# Patient Record
Sex: MALE | Race: OTHER | URBAN - METROPOLITAN AREA
[De-identification: names, ages, dates, MRNs, and addresses within clinical notes are randomized per-mention and may not be internally consistent; named-entity substitution may affect disease eponyms.]

---

## 2018-01-19 ENCOUNTER — OFFICE VISIT (OUTPATIENT)
Dept: FAMILY MEDICINE CLINIC | Age: 53
End: 2018-01-19

## 2018-01-19 ENCOUNTER — HOSPITAL ENCOUNTER (OUTPATIENT)
Dept: CT IMAGING | Age: 53
Discharge: HOME | End: 2018-01-19
Attending: FAMILY MEDICINE | Admitting: FAMILY MEDICINE

## 2018-01-19 VITALS
SYSTOLIC BLOOD PRESSURE: 134 MMHG | OXYGEN SATURATION: 98 % | HEIGHT: 66 IN | BODY MASS INDEX: 33.75 KG/M2 | HEART RATE: 87 BPM | DIASTOLIC BLOOD PRESSURE: 84 MMHG | WEIGHT: 210 LBS

## 2018-01-19 DIAGNOSIS — R31.29 OTHER MICROSCOPIC HEMATURIA: ICD-10-CM

## 2018-01-19 DIAGNOSIS — R10.9 LEFT FLANK PAIN: ICD-10-CM

## 2018-01-19 DIAGNOSIS — R03.0 ELEVATED BLOOD PRESSURE READING: ICD-10-CM

## 2018-01-19 DIAGNOSIS — R10.9 LEFT FLANK PAIN: Primary | ICD-10-CM

## 2018-01-19 DIAGNOSIS — R10.9 ABDOMINAL PAIN: ICD-10-CM

## 2018-01-19 LAB
BILIRUBIN, POC: NEGATIVE
BLOOD URINE, POC: ABNORMAL
CLARITY, POC: CLEAR
COLOR, POC: YELLOW
GLUCOSE URINE, POC: NEGATIVE
KETONES, POC: NEGATIVE
LEUKOCYTE EST, POC: ABNORMAL
NITRITE, POC: ABNORMAL
PH, POC: 5.5
PROTEIN, POC: 30
SPECIFIC GRAVITY, POC: 1.02
UROBILINOGEN, POC: 0.2

## 2018-01-19 PROCEDURE — 81002 URINALYSIS NONAUTO W/O SCOPE: CPT | Performed by: FAMILY MEDICINE

## 2018-01-19 PROCEDURE — 99203 OFFICE O/P NEW LOW 30 MIN: CPT | Performed by: FAMILY MEDICINE

## 2018-01-19 RX ORDER — TAMSULOSIN HYDROCHLORIDE 0.4 MG/1
0.4 CAPSULE ORAL DAILY
Qty: 14 CAPSULE | Refills: 0 | Status: SHIPPED | OUTPATIENT
Start: 2018-01-19 | End: 2018-02-05

## 2018-01-19 RX ORDER — CIPROFLOXACIN 500 MG/1
500 TABLET, FILM COATED ORAL 2 TIMES DAILY
Qty: 14 TABLET | Refills: 0 | Status: SHIPPED | OUTPATIENT
Start: 2018-01-19 | End: 2018-01-26

## 2018-01-19 ASSESSMENT — PATIENT HEALTH QUESTIONNAIRE - PHQ9
SUM OF ALL RESPONSES TO PHQ QUESTIONS 1-9: 0
1. LITTLE INTEREST OR PLEASURE IN DOING THINGS: 0
2. FEELING DOWN, DEPRESSED OR HOPELESS: 0
SUM OF ALL RESPONSES TO PHQ9 QUESTIONS 1 & 2: 0

## 2018-01-19 NOTE — PROGRESS NOTES
Guadarrama Caren , 52 y.o. Chief Complaint   Patient presents with    Back Pain     left side flank area. Started yesterday with left flank mild intensity constant with periods were it becomes moderate intensity. Pain at times radiates to left lower abdomen. Pain now is 2/10. No dysuria, no frequency and no urgency. Some stool tenesmus but no diarrhea or constipation. No fever, no nausea a no vomiting. Had an episode of left lower abdominal pain about 6 months and he was diagnosed then with diverticulitis. History of elevated blood pressure and HLP. No surgeries. Does not take prescription medications or OTC meds regularly. No Known Allergies    Social History     Social History    Marital status: Single     Spouse name: N/A    Number of children: N/A    Years of education: N/A     Occupational History    Not on file. Social History Main Topics    Smoking status: Never Smoker    Smokeless tobacco: Never Used    Alcohol use No    Drug use: No    Sexual activity: No     Other Topics Concern    Not on file     Social History Narrative    No narrative on file       Review of Systems   Respiratory: Negative. Cardiovascular: Negative. Neurological: Negative for headaches. OBJECTIVE    Vitals:    01/19/18 1450 01/19/18 1519   BP: (!) 160/100 134/84   Site: Left Arm    Position: Sitting    Cuff Size: Medium Adult    Pulse: 87    SpO2: 98%    Weight: 210 lb (95.3 kg)    Height: 5' 5.75\" (1.67 m)      Body mass index is 34.16 kg/m². Wt Readings from Last 3 Encounters:   01/19/18 210 lb (95.3 kg)     BP Readings from Last 3 Encounters:   01/19/18 134/84     Physical Exam   Constitutional: He is oriented to person, place, and time and well-developed, well-nourished, and in no distress. No distress. HENT:   Mouth/Throat: Oropharynx is clear and moist. No oropharyngeal exudate. Eyes: Pupils are equal, round, and reactive to light. Neck: No thyromegaly present.

## 2018-01-21 LAB — URINE CULTURE, ROUTINE: NORMAL

## 2018-01-25 ASSESSMENT — ENCOUNTER SYMPTOMS: RESPIRATORY NEGATIVE: 1

## 2018-01-26 ENCOUNTER — TELEPHONE (OUTPATIENT)
Dept: FAMILY MEDICINE CLINIC | Age: 53
End: 2018-01-26

## 2018-02-05 ENCOUNTER — OFFICE VISIT (OUTPATIENT)
Dept: FAMILY MEDICINE CLINIC | Age: 53
End: 2018-02-05

## 2018-02-05 VITALS
SYSTOLIC BLOOD PRESSURE: 130 MMHG | HEART RATE: 84 BPM | DIASTOLIC BLOOD PRESSURE: 80 MMHG | BODY MASS INDEX: 34.06 KG/M2 | OXYGEN SATURATION: 98 % | WEIGHT: 209.4 LBS

## 2018-02-05 DIAGNOSIS — E78.49 OTHER HYPERLIPIDEMIA: ICD-10-CM

## 2018-02-05 DIAGNOSIS — N20.1 URETEROLITHIASIS: ICD-10-CM

## 2018-02-05 PROCEDURE — 99214 OFFICE O/P EST MOD 30 MIN: CPT | Performed by: FAMILY MEDICINE

## 2018-02-05 RX ORDER — ATORVASTATIN CALCIUM 20 MG/1
20 TABLET, FILM COATED ORAL DAILY
Qty: 30 TABLET | Refills: 3 | Status: SHIPPED | OUTPATIENT
Start: 2018-02-05 | End: 2018-05-05 | Stop reason: SDUPTHER

## 2018-02-05 ASSESSMENT — ENCOUNTER SYMPTOMS
SPUTUM PRODUCTION: 0
GASTROINTESTINAL NEGATIVE: 1
SHORTNESS OF BREATH: 0
WHEEZING: 0
COUGH: 1
HEMOPTYSIS: 0

## 2018-02-05 NOTE — PROGRESS NOTES
History   Smoking Status    Never Smoker   Smokeless Tobacco    Never Used     PMH, surgeries, SH, FH, allergies reviewed. Medication list reviewed and updated. Chief Complaint   Patient presents with    Results     follow up on abdominal pain      No more pain, pain resolved next day after CT was done,no back pain, no urinary symptoms. Normal bowel movements. No hematuria. Completed cipro and flomax no side effects. Review of Systems   Constitutional: Negative. HENT: Positive for congestion. Respiratory: Positive for cough. Negative for hemoptysis, sputum production, shortness of breath and wheezing. Cardiovascular: Negative. Gastrointestinal: Negative. Genitourinary: Negative. Skin: Negative for itching and rash. Objective:   VS reviewed    Vitals:    02/05/18 0931   BP: 130/80   Site: Left Arm   Position: Sitting   Cuff Size: Large Adult   Pulse: 84   SpO2: 98%   Weight: 209 lb 6.4 oz (95 kg)     Body mass index is 34.06 kg/m². Wt Readings from Last 3 Encounters:   02/05/18 209 lb 6.4 oz (95 kg)   01/19/18 210 lb (95.3 kg)     BP Readings from Last 3 Encounters:   02/05/18 130/80   01/19/18 134/84       Physical Exam   Constitutional: He is oriented to person, place, and time and well-developed, well-nourished, and in no distress. No distress. HENT:   Right Ear: Tympanic membrane and ear canal normal.   Left Ear: Tympanic membrane and ear canal normal.   Mouth/Throat: Oropharynx is clear and moist and mucous membranes are normal. No oropharyngeal exudate. Eyes: Conjunctivae and EOM are normal. Pupils are equal, round, and reactive to light. Right eye exhibits no discharge. Left eye exhibits no discharge. Neck: No thyromegaly present. Cardiovascular: Normal rate, regular rhythm, normal heart sounds and intact distal pulses. No murmur heard. Pulmonary/Chest: Effort normal and breath sounds normal. No respiratory distress. Abdominal: Soft.  Bowel sounds are normal. He

## 2018-02-09 ENCOUNTER — HOSPITAL ENCOUNTER (OUTPATIENT)
Dept: CT IMAGING | Age: 53
Discharge: HOME | End: 2018-02-09
Admitting: FAMILY MEDICINE

## 2018-02-09 DIAGNOSIS — N20.1 URETEROLITHIASIS: ICD-10-CM

## 2018-02-09 DIAGNOSIS — Z12.11 SCREENING FOR COLON CANCER: ICD-10-CM

## 2018-02-09 DIAGNOSIS — Z12.11 SCREENING FOR COLON CANCER: Primary | ICD-10-CM

## 2018-02-09 LAB
CONTROL: POSITIVE
HEMOCCULT STL QL: NEGATIVE

## 2018-02-09 PROCEDURE — 82274 ASSAY TEST FOR BLOOD FECAL: CPT | Performed by: FAMILY MEDICINE

## 2018-02-16 DIAGNOSIS — Z12.5 SCREENING FOR PROSTATE CANCER: Primary | ICD-10-CM

## 2018-03-09 ENCOUNTER — TELEPHONE (OUTPATIENT)
Dept: FAMILY MEDICINE CLINIC | Age: 53
End: 2018-03-09

## 2018-04-20 DIAGNOSIS — Z12.11 SCREENING FOR COLON CANCER: Primary | ICD-10-CM

## 2018-05-07 RX ORDER — ATORVASTATIN CALCIUM 20 MG/1
TABLET, FILM COATED ORAL
Qty: 30 TABLET | Refills: 0 | Status: SHIPPED | OUTPATIENT
Start: 2018-05-07 | End: 2018-06-06 | Stop reason: SDUPTHER

## 2018-05-19 LAB
A/G RATIO: NORMAL
ALBUMIN SERPL-MCNC: 4.4 G/DL
ALP BLD-CCNC: 63 U/L
ALT SERPL-CCNC: 39 U/L
AST SERPL-CCNC: 29 U/L
BILIRUB SERPL-MCNC: 0.7 MG/DL (ref 0.1–1.4)
BILIRUBIN DIRECT: 0.19 MG/DL
BILIRUBIN, INDIRECT: NORMAL
GLOBULIN: NORMAL
PROTEIN TOTAL: 7 G/DL

## 2018-05-22 ENCOUNTER — TELEPHONE (OUTPATIENT)
Dept: FAMILY MEDICINE CLINIC | Age: 53
End: 2018-05-22

## 2018-06-06 RX ORDER — ATORVASTATIN CALCIUM 20 MG/1
20 TABLET, FILM COATED ORAL DAILY
Qty: 90 TABLET | Refills: 1 | Status: SHIPPED | OUTPATIENT
Start: 2018-06-06 | End: 2018-08-29 | Stop reason: SDUPTHER

## 2018-06-20 ENCOUNTER — TELEPHONE (OUTPATIENT)
Dept: FAMILY MEDICINE CLINIC | Age: 53
End: 2018-06-20

## 2018-07-09 ENCOUNTER — TELEPHONE (OUTPATIENT)
Dept: FAMILY MEDICINE CLINIC | Age: 53
End: 2018-07-09

## 2018-07-23 ENCOUNTER — OFFICE VISIT (OUTPATIENT)
Dept: FAMILY MEDICINE CLINIC | Age: 53
End: 2018-07-23

## 2018-07-23 VITALS
SYSTOLIC BLOOD PRESSURE: 138 MMHG | WEIGHT: 215.8 LBS | BODY MASS INDEX: 35.1 KG/M2 | DIASTOLIC BLOOD PRESSURE: 88 MMHG | OXYGEN SATURATION: 98 % | HEART RATE: 78 BPM

## 2018-07-23 DIAGNOSIS — E78.49 OTHER HYPERLIPIDEMIA: Primary | ICD-10-CM

## 2018-07-23 DIAGNOSIS — I10 ESSENTIAL HYPERTENSION: ICD-10-CM

## 2018-07-23 PROCEDURE — 99214 OFFICE O/P EST MOD 30 MIN: CPT | Performed by: FAMILY MEDICINE

## 2018-07-23 RX ORDER — LISINOPRIL 10 MG/1
10 TABLET ORAL DAILY
Qty: 90 TABLET | Refills: 1 | Status: SHIPPED | OUTPATIENT
Start: 2018-07-23 | End: 2018-08-29 | Stop reason: SDUPTHER

## 2018-07-23 NOTE — PROGRESS NOTES
History   Smoking Status    Never Smoker   Smokeless Tobacco    Never Used     PMH, surgeries, SH, FH, allergies reviewed. Medication list reviewed and updated. Chief Complaint   Patient presents with    Hyperlipidemia     Started low dose statin about 5 months reports no myalgias. He is not exercising neither following a low cholesterol/fat diet. Otherwise he feels well. Review of Systems   Cardiovascular: Negative. Gastrointestinal: Negative. Genitourinary: Negative. Neurological: Negative for dizziness and headaches. Objective:   VS reviewed    Vitals:    07/23/18 1201 07/23/18 1219   BP: (!) 138/92 138/88   Site: Left Arm    Position: Sitting    Cuff Size: Large Adult    Pulse: 78    SpO2: 98%    Weight: 215 lb 12.8 oz (97.9 kg)      Body mass index is 35.1 kg/m². Wt Readings from Last 3 Encounters:   07/23/18 215 lb 12.8 oz (97.9 kg)   02/05/18 209 lb 6.4 oz (95 kg)   01/19/18 210 lb (95.3 kg)     BP Readings from Last 3 Encounters:   07/23/18 138/88   02/05/18 130/80   01/19/18 134/84       Physical Exam   Constitutional: He is oriented to person, place, and time. No distress. Neck: No JVD present. No thyromegaly present. Cardiovascular: Normal rate, regular rhythm, normal heart sounds and intact distal pulses. No murmur heard. Pulmonary/Chest: Effort normal and breath sounds normal. No respiratory distress. Abdominal: Soft. He exhibits no distension. There is no tenderness. Musculoskeletal: He exhibits no edema. Neurological: He is alert and oriented to person, place, and time. Skin: No rash noted. No erythema. No pallor.    Psychiatric: Mood, memory, affect and judgment normal.       Lab Results   Component Value Date    PROT 7.0 05/19/2018    LABALBU 4.4 05/19/2018    BILITOT 0.7 05/19/2018    ALKPHOS 63 05/19/2018    AST 29 05/19/2018    ALT 39 05/19/2018         No results found for: WBC, HGB, HCT, MCV, PLT    No results found for: CHOL  No results found for: TRIG  No

## 2018-07-23 NOTE — PATIENT INSTRUCTIONS
products made from soybeans, like tofu. Soybeans may be very good for your heart. · Eat at least 2 servings of fish a week. Fish with omega-3 fatty acids may help reduce your risk of getting coronary artery disease. Freedom and sardines are good examples. · Choose low-fat or fat-free milk and dairy products. Eat foods high in fiber  · Foods high in fiber may reduce your cholesterol. And they may give you important vitamins and minerals. Good examples are oatmeal, cooked dried beans, brown rice, citrus fruits, and apples. · Eat whole-grain breads and cereals. They have more fiber than white bread or pastries. Limit high-sugar foods  · Limit foods and drinks that are high in sugar. Some examples are soda pop, sugar-sweetened fruit drinks, candy, and many desserts. · Limit the amount of sugar, honey, and other sweeteners that you add to food and drinks. · Choose water instead of soda pop or other sugar-sweetened drinks. · Limit fruit juice. Limit salt and sodium  · You can help lower your blood pressure if you limit salt and sodium. · If you take the salt shaker off the table, it may be easier to use less salt. You can also try using half the salt in a recipe. And you can avoid adding salt to cooking water for pasta, rice, and potatoes. · Try to eat fewer snacks, fast foods, and other high-salt, processed foods. Check labels so you know how much sodium a food has. · Choose canned goods (soups, vegetables, and beans) that are low in sodium. Get regular exercise  · Get more exercise. Make sure your doctor knows when you start a new exercise program. Even small amounts of exercise will help you get stronger and have more energy. It can also help you manage your weight and your stress. · Walking is a good choice. Little by little, increase the amount you walk every day. Try for at least 30 minutes on most days of the week. Where can you learn more? Go to https://ilana.[employer]. org and sign in to your [employer] account. Enter [id number] in the [facility] box to learn more about \"Diet and Exercise for Metabolic Syndrome: Care Instructions. \"     If you do not have an account, please click on the \"Sign Up Now\" link. Current as of: May 12, 2017  Content Version: 11.6  © 8993-1606 [employer]. Care instructions adapted under license by [facility] ([facility]). If you have questions about a medical condition or this instruction, always ask your healthcare professional. [address] any warranty or liability for your use of this information.

## 2018-07-24 ASSESSMENT — ENCOUNTER SYMPTOMS: GASTROINTESTINAL NEGATIVE: 1

## 2018-08-29 ENCOUNTER — OFFICE VISIT (OUTPATIENT)
Dept: FAMILY MEDICINE CLINIC | Age: 53
End: 2018-08-29

## 2018-08-29 VITALS
SYSTOLIC BLOOD PRESSURE: 118 MMHG | WEIGHT: 216 LBS | OXYGEN SATURATION: 98 % | HEART RATE: 84 BPM | BODY MASS INDEX: 35.13 KG/M2 | DIASTOLIC BLOOD PRESSURE: 78 MMHG

## 2018-08-29 DIAGNOSIS — R73.03 PREDIABETES: ICD-10-CM

## 2018-08-29 DIAGNOSIS — E55.9 VITAMIN D DEFICIENCY: ICD-10-CM

## 2018-08-29 DIAGNOSIS — N40.0 ENLARGED PROSTATE: ICD-10-CM

## 2018-08-29 DIAGNOSIS — I10 ESSENTIAL HYPERTENSION: Primary | ICD-10-CM

## 2018-08-29 LAB
BILIRUBIN, POC: NEGATIVE
BLOOD URINE, POC: NEGATIVE
CLARITY, POC: CLEAR
COLOR, POC: YELLOW
GLUCOSE URINE, POC: NEGATIVE
KETONES, POC: NEGATIVE
LEUKOCYTE EST, POC: ABNORMAL
NITRITE, POC: NEGATIVE
PH, POC: 5.5
PROTEIN, POC: NEGATIVE
SPECIFIC GRAVITY, POC: 1.02
UROBILINOGEN, POC: 0.2

## 2018-08-29 PROCEDURE — 81002 URINALYSIS NONAUTO W/O SCOPE: CPT | Performed by: FAMILY MEDICINE

## 2018-08-29 PROCEDURE — 99213 OFFICE O/P EST LOW 20 MIN: CPT | Performed by: FAMILY MEDICINE

## 2018-08-29 RX ORDER — LISINOPRIL 10 MG/1
10 TABLET ORAL DAILY
Qty: 90 TABLET | Refills: 1 | Status: SHIPPED | OUTPATIENT
Start: 2018-08-29 | End: 2019-02-15 | Stop reason: SDUPTHER

## 2018-08-29 RX ORDER — ATORVASTATIN CALCIUM 20 MG/1
20 TABLET, FILM COATED ORAL DAILY
Qty: 90 TABLET | Refills: 1 | Status: SHIPPED | OUTPATIENT
Start: 2018-08-29 | End: 2019-03-07 | Stop reason: SDUPTHER

## 2018-08-29 NOTE — PROGRESS NOTES
History   Smoking Status    Never Smoker   Smokeless Tobacco    Never Used     PMH, surgeries, SH, FH, allergies reviewed. Medication list reviewed. Chief Complaint   Patient presents with    Hypertension     Hypertension:  Denies chest pain, SOB, cough, visual changes, dizziness, palpitations or headache. He is not adherent to a low sodium diet. He is not adherent to an exercise program.  He is  compliant with medications. Blood pressure typically runs unknown outside of the office. Objective:   VS reviewed    Vitals:    08/29/18 0818 08/29/18 0842   BP: 130/82 118/78   Site: Left Arm    Position: Sitting    Cuff Size: Medium Adult    Pulse: 84    SpO2: 98%    Weight: 216 lb (98 kg)      Body mass index is 35.13 kg/m². Wt Readings from Last 3 Encounters:   08/29/18 216 lb (98 kg)   07/23/18 215 lb 12.8 oz (97.9 kg)   02/05/18 209 lb 6.4 oz (95 kg)     BP Readings from Last 3 Encounters:   08/29/18 118/78   07/23/18 138/88   02/05/18 130/80       Physical Exam   Constitutional: He is oriented to person, place, and time. No distress. Neck: No JVD present. No thyromegaly present. Cardiovascular: Normal rate, regular rhythm, normal heart sounds and intact distal pulses. No murmur heard. Pulmonary/Chest: Effort normal and breath sounds normal. No respiratory distress. Musculoskeletal: He exhibits no edema. Neurological: He is alert and oriented to person, place, and time.    Psychiatric: Mood, memory, affect and judgment normal.       Lab Results   Component Value Date    PROT 7.0 05/19/2018    LABALBU 4.4 05/19/2018    BILITOT 0.7 05/19/2018    ALKPHOS 63 05/19/2018    AST 29 05/19/2018    ALT 39 05/19/2018         No results found for: WBC, HGB, HCT, MCV, PLT    No results found for: CHOL  No results found for: TRIG  No results found for: HDL  No results found for: LDLCHOLESTEROL, LDLCALC  No results found for: LABVLDL, VLDL  No results found for: CHOLHDLRATIO    No results found for: Maximino Hudson    No results found for: LABA1C  No results found for: EAG      Assessment/Plan:    1. Essential hypertension  Well controlled HTN, continue lisinopril 10 mg daily. Blood work done through [facility] reviewed initial  's and current 80. Trig continue to be slightly elevated and HDL is good. Normal kidney function.   - Lipid Panel; Future  - Comprehensive Metabolic Panel; Future    2. Prediabetes  - Hemoglobin A1C; Future    3. Enlarged prostate  - PSA, Diagnostic; Future    4. Vitamin D deficiency  - Vitamin D 25 Hydroxy; Future  - Vitamin B12 & Folate; Future  - CBC Auto Differential; Future      Return in about 5 months (around 1/29/2019) for 4-5 months for HTN, HLP and prediabetes . Haylie Ramila received counseling on the following healthy behaviors: nutrition and exercise    Discussed use, benefit, and side effects of prescribed medications. Barriers to medication compliance addressed. All patient questions answered. Pt voiced understanding.

## 2018-08-31 LAB — URINE CULTURE, ROUTINE: NORMAL

## 2019-01-09 LAB
ALBUMIN SERPL-MCNC: 4.5 G/DL
ALP BLD-CCNC: 67 U/L
ALT SERPL-CCNC: 42 U/L
ANION GAP SERPL CALCULATED.3IONS-SCNC: 2.1 MMOL/L
AST SERPL-CCNC: 27 U/L
BILIRUB SERPL-MCNC: 0.6 MG/DL (ref 0.1–1.4)
BUN BLDV-MCNC: 13 MG/DL
CALCIUM SERPL-MCNC: 9.6 MG/DL
CHLORIDE BLD-SCNC: 105 MMOL/L
CHOLESTEROL, TOTAL: 181 MG/DL
CHOLESTEROL/HDL RATIO: NORMAL
CO2: 22 MMOL/L
CREAT SERPL-MCNC: 0.97 MG/DL
FOLATE: 9.7
GFR CALCULATED: 100
GLUCOSE BLD-MCNC: 94 MG/DL
HDLC SERPL-MCNC: 37 MG/DL (ref 35–70)
LDL CHOLESTEROL CALCULATED: 106 MG/DL (ref 0–160)
POTASSIUM SERPL-SCNC: 4.5 MMOL/L
PROSTATE SPECIFIC ANTIGEN: 1.8 NG/ML
SODIUM BLD-SCNC: 142 MMOL/L
TOTAL PROTEIN: 6.6
TRIGL SERPL-MCNC: 190 MG/DL
VITAMIN B-12: 277
VITAMIN D 25-HYDROXY: 29.6
VITAMIN D2, 25 HYDROXY: NORMAL
VITAMIN D3,25 HYDROXY: NORMAL
VLDLC SERPL CALC-MCNC: 38 MG/DL

## 2019-01-10 LAB
HCT VFR BLD CALC: 46.6 % (ref 41–53)
HEMOGLOBIN: 15.7 G/DL (ref 13.5–17.5)
PLATELET # BLD: 213 K/ΜL
PROSTATE SPECIFIC ANTIGEN FREE: NORMAL NG/ML
PROSTATE SPECIFIC ANTIGEN PERCENT FREE: NORMAL %
PROSTATE SPECIFIC ANTIGEN: 1.8 NG/ML
WBC # BLD: 6.2 10^3/ML

## 2019-01-15 ENCOUNTER — TELEPHONE (OUTPATIENT)
Dept: FAMILY MEDICINE CLINIC | Age: 54
End: 2019-01-15

## 2019-01-17 LAB
ALBUMIN SERPL-MCNC: 4.5 G/DL
ALP BLD-CCNC: 67 U/L
ALT SERPL-CCNC: 42 U/L
ANION GAP SERPL CALCULATED.3IONS-SCNC: NORMAL MMOL/L
AST SERPL-CCNC: 27 U/L
BASOPHILS ABSOLUTE: 0 /ΜL
BASOPHILS RELATIVE PERCENT: 1 %
BILIRUB SERPL-MCNC: 0.6 MG/DL (ref 0.1–1.4)
BUN BLDV-MCNC: 13 MG/DL
CALCIUM SERPL-MCNC: 9.6 MG/DL
CHLORIDE BLD-SCNC: 105 MMOL/L
CHOLESTEROL, TOTAL: 181 MG/DL
CHOLESTEROL/HDL RATIO: NORMAL
CO2: 22 MMOL/L
CREAT SERPL-MCNC: 0.97 MG/DL
EOSINOPHILS ABSOLUTE: 0.3 /ΜL
EOSINOPHILS RELATIVE PERCENT: 5 %
GFR CALCULATED: NORMAL
GLUCOSE BLD-MCNC: 94 MG/DL
HCT VFR BLD CALC: 46.6 % (ref 41–53)
HDLC SERPL-MCNC: 37 MG/DL (ref 35–70)
HEMOGLOBIN: 15.7 G/DL (ref 13.5–17.5)
LDL CHOLESTEROL CALCULATED: 106 MG/DL (ref 0–160)
LYMPHOCYTES ABSOLUTE: 2.5 /ΜL
LYMPHOCYTES RELATIVE PERCENT: 40 %
MCH RBC QN AUTO: NORMAL PG
MCHC RBC AUTO-ENTMCNC: NORMAL G/DL
MCV RBC AUTO: NORMAL FL
MONOCYTES ABSOLUTE: 0.4 /ΜL
MONOCYTES RELATIVE PERCENT: 6 %
NEUTROPHILS ABSOLUTE: 3 /ΜL
NEUTROPHILS RELATIVE PERCENT: 48 %
PLATELET # BLD: 213 K/ΜL
PMV BLD AUTO: NORMAL FL
POTASSIUM SERPL-SCNC: 4.5 MMOL/L
RBC # BLD: 5.23 10^6/ΜL
SODIUM BLD-SCNC: 142 MMOL/L
TOTAL PROTEIN: 6.6
TRIGL SERPL-MCNC: 190 MG/DL
VITAMIN B-12: 277
VITAMIN D 25-HYDROXY: 29.6
VITAMIN D2, 25 HYDROXY: NORMAL
VITAMIN D3,25 HYDROXY: NORMAL
VLDLC SERPL CALC-MCNC: 38 MG/DL
WBC # BLD: 6.2 10^3/ML

## 2019-01-30 ENCOUNTER — OFFICE VISIT (OUTPATIENT)
Dept: FAMILY MEDICINE CLINIC | Age: 54
End: 2019-01-30
Payer: COMMERCIAL

## 2019-01-30 VITALS
SYSTOLIC BLOOD PRESSURE: 120 MMHG | OXYGEN SATURATION: 98 % | HEART RATE: 78 BPM | BODY MASS INDEX: 33.31 KG/M2 | WEIGHT: 204.8 LBS | DIASTOLIC BLOOD PRESSURE: 82 MMHG

## 2019-01-30 DIAGNOSIS — N40.0 ENLARGED PROSTATE: ICD-10-CM

## 2019-01-30 DIAGNOSIS — E78.49 OTHER HYPERLIPIDEMIA: ICD-10-CM

## 2019-01-30 DIAGNOSIS — R73.03 PREDIABETES: ICD-10-CM

## 2019-01-30 DIAGNOSIS — I10 ESSENTIAL HYPERTENSION: Primary | ICD-10-CM

## 2019-01-30 DIAGNOSIS — M54.5 LEFT LOW BACK PAIN, UNSPECIFIED CHRONICITY, WITH SCIATICA PRESENCE UNSPECIFIED: ICD-10-CM

## 2019-01-30 PROCEDURE — 99214 OFFICE O/P EST MOD 30 MIN: CPT | Performed by: FAMILY MEDICINE

## 2019-01-30 RX ORDER — ASCORBIC ACID 500 MG
500 TABLET ORAL DAILY
COMMUNITY

## 2019-02-15 RX ORDER — LISINOPRIL 10 MG/1
10 TABLET ORAL DAILY
Qty: 90 TABLET | Refills: 1 | Status: SHIPPED | OUTPATIENT
Start: 2019-02-15 | End: 2019-06-07 | Stop reason: SDUPTHER

## 2019-03-07 RX ORDER — ATORVASTATIN CALCIUM 20 MG/1
20 TABLET, FILM COATED ORAL DAILY
Qty: 90 TABLET | Refills: 1 | Status: SHIPPED | OUTPATIENT
Start: 2019-03-07 | End: 2019-07-25 | Stop reason: SDUPTHER

## 2019-06-11 RX ORDER — LISINOPRIL 10 MG/1
TABLET ORAL
Qty: 90 TABLET | Refills: 0 | Status: SHIPPED | OUTPATIENT
Start: 2019-06-11 | End: 2019-12-04 | Stop reason: SDUPTHER

## 2019-06-12 LAB
ALBUMIN SERPL-MCNC: 4.4 G/DL
ALP BLD-CCNC: 61 U/L
ALT SERPL-CCNC: 33 U/L
ANION GAP SERPL CALCULATED.3IONS-SCNC: 1.7 MMOL/L
AST SERPL-CCNC: 28 U/L
AVERAGE GLUCOSE: NORMAL
BILIRUB SERPL-MCNC: 0.5 MG/DL (ref 0.1–1.4)
BUN BLDV-MCNC: 17 MG/DL
CALCIUM SERPL-MCNC: 9.6 MG/DL
CHLORIDE BLD-SCNC: 106 MMOL/L
CHOLESTEROL, TOTAL: 147 MG/DL
CHOLESTEROL/HDL RATIO: ABNORMAL
CO2: 22 MMOL/L
CREAT SERPL-MCNC: 1.02 MG/DL
GFR CALCULATED: 95
GLUCOSE BLD-MCNC: 99 MG/DL
HBA1C MFR BLD: 5.8 %
HDLC SERPL-MCNC: 40 MG/DL (ref 35–70)
LDL CHOLESTEROL CALCULATED: 87 MG/DL (ref 0–160)
POTASSIUM SERPL-SCNC: 4.5 MMOL/L
SODIUM BLD-SCNC: 142 MMOL/L
TOTAL PROTEIN: 7.9
TRIGL SERPL-MCNC: 101 MG/DL
VLDLC SERPL CALC-MCNC: 20 MG/DL

## 2019-06-17 ENCOUNTER — TELEPHONE (OUTPATIENT)
Dept: FAMILY MEDICINE CLINIC | Age: 54
End: 2019-06-17

## 2019-07-17 ENCOUNTER — OFFICE VISIT (OUTPATIENT)
Dept: FAMILY MEDICINE CLINIC | Age: 54
End: 2019-07-17
Payer: COMMERCIAL

## 2019-07-17 VITALS
DIASTOLIC BLOOD PRESSURE: 82 MMHG | HEART RATE: 58 BPM | WEIGHT: 212.2 LBS | SYSTOLIC BLOOD PRESSURE: 110 MMHG | OXYGEN SATURATION: 99 % | BODY MASS INDEX: 34.51 KG/M2

## 2019-07-17 DIAGNOSIS — E78.49 OTHER HYPERLIPIDEMIA: ICD-10-CM

## 2019-07-17 DIAGNOSIS — Z72.89 OTHER PROBLEMS RELATED TO LIFESTYLE: ICD-10-CM

## 2019-07-17 DIAGNOSIS — R73.03 PREDIABETES: ICD-10-CM

## 2019-07-17 DIAGNOSIS — I10 ESSENTIAL HYPERTENSION: Primary | ICD-10-CM

## 2019-07-17 DIAGNOSIS — N40.0 ENLARGED PROSTATE: ICD-10-CM

## 2019-07-17 DIAGNOSIS — Z11.4 SCREENING FOR HIV WITHOUT PRESENCE OF RISK FACTORS: ICD-10-CM

## 2019-07-17 PROCEDURE — 99213 OFFICE O/P EST LOW 20 MIN: CPT | Performed by: FAMILY MEDICINE

## 2019-07-17 RX ORDER — DEXTRIN 3 G/3.5 G
2 POWDER (GRAM) ORAL DAILY
COMMUNITY

## 2019-07-17 ASSESSMENT — ENCOUNTER SYMPTOMS
GASTROINTESTINAL NEGATIVE: 1
RESPIRATORY NEGATIVE: 1

## 2019-07-17 NOTE — PROGRESS NOTES
Social History     Tobacco Use   Smoking Status Never Smoker   Smokeless Tobacco Never Used     PMH, surgeries, SH, FH, allergies reviewed. Medication list reviewed. Chief Complaint   Patient presents with    Hypertension     Hypertension:  Denies chest pain, SOB, cough, visual changes, dizziness, palpitations or headache. He is adherent to a low sodium diet. He is  adherent to an exercise program.  He is  compliant with medications. Review of Systems   Constitutional: Negative. Respiratory: Negative. Cardiovascular: Negative. Gastrointestinal: Negative. Genitourinary: Negative. Musculoskeletal: Negative. Objective:   VS reviewed    Vitals:    07/17/19 0801   BP: 110/82   Site: Left Upper Arm   Position: Sitting   Cuff Size: Large Adult   Pulse: 58   SpO2: 99%   Weight: 212 lb 3.2 oz (96.3 kg)     Body mass index is 34.51 kg/m². Wt Readings from Last 3 Encounters:   07/17/19 212 lb 3.2 oz (96.3 kg)   01/30/19 204 lb 12.8 oz (92.9 kg)   08/29/18 216 lb (98 kg)     BP Readings from Last 3 Encounters:   07/17/19 110/82   01/30/19 120/82   08/29/18 118/78       Physical Exam   Constitutional: He is oriented to person, place, and time. No distress. HENT:   Mouth/Throat: No oropharyngeal exudate. Eyes: Pupils are equal, round, and reactive to light. Neck: No JVD present. No thyromegaly present. Cardiovascular: Normal rate, regular rhythm, normal heart sounds and intact distal pulses. No murmur heard. Pulmonary/Chest: Effort normal and breath sounds normal. No respiratory distress. Abdominal: Soft. He exhibits no distension. There is no tenderness. Musculoskeletal: He exhibits no edema. Neurological: He is alert and oriented to person, place, and time. Skin: Skin is warm. Capillary refill takes less than 2 seconds. No rash noted. No erythema. No pallor. Psychiatric: He has a normal mood and affect.  His behavior is normal. Judgment and thought content normal. Lab Results   Component Value Date     06/12/2019    K 4.5 06/12/2019     06/12/2019    CO2 22 06/12/2019    BUN 17 06/12/2019    CREATININE 1.02 06/12/2019    GLUCOSE 99 06/12/2019    CALCIUM 9.6 06/12/2019    PROT 7.0 05/19/2018    LABALBU 4.4 06/12/2019    BILITOT 0.5 06/12/2019    ALKPHOS 61 06/12/2019    AST 28 06/12/2019    ALT 33 06/12/2019    LABGLOM 95 06/12/2019       Lab Results   Component Value Date    WBC 6.2 01/10/2019    HGB 15.7 01/10/2019    HCT 46.6 01/10/2019     01/10/2019       Lab Results   Component Value Date    CHOL 147 06/12/2019    CHOL 181 01/17/2019    CHOL 181 01/09/2019     Lab Results   Component Value Date    TRIG 101 06/12/2019    TRIG 190 01/17/2019    TRIG 190 01/09/2019     Lab Results   Component Value Date    HDL 40 (A) 06/12/2019    HDL 37 01/17/2019    HDL 37 01/09/2019     Lab Results   Component Value Date    LDLCALC 87 06/12/2019    LDLCALC 106 01/17/2019    LDLCALC 106 01/09/2019     Lab Results   Component Value Date    VLDL 20 06/12/2019    VLDL 38 01/17/2019    VLDL 38 01/09/2019     No results found for: CHOLHDLRATIO    No results found for: LABMICR, [id number]    Lab Results   Component Value Date    LABA1C 5.8 06/12/2019     No results found for: EAG    Lab Results   Component Value Date    PSA 1.8 01/10/2019    PSA 1.8 01/09/2019       Assessment/Plan:    1. Essential hypertension  HTN well controlled on ACE. Lipids are good on statin. On low dose statin, no myalgias and normal hepatic function. . Kidney function and screening for nephropathy are normal/negative. - Comprehensive Metabolic Panel; Future  - Lipid Panel; Future    2. Other hyperlipidemia  - Lipid Panel; Future    3. Prediabetes  Stable. Managed with diet and exercise. - Hemoglobin A1C; Future    4. Enlarged prostate  Asymptomatic, PSA stable <2.   - PSA, Prostatic Specific Antigen; Future    5. Other problems related to lifestyle  - Hepatitis C Antibody; Future    6. Screening for HIV without presence of risk factors    - HIV Screen; Future    Screening for HIV and Hep C  counseling done today and test were ordered    Return in about 6 months (around 1/17/2020) for HTN . Gualberto Plume received counseling on the following healthy behaviors: nutrition and exercise    Discussed use, benefit, and side effects of prescribed medications. Barriers to medication compliance addressed. All patient questions answered. Pt voiced understanding.

## 2019-12-04 RX ORDER — ATORVASTATIN CALCIUM 20 MG/1
TABLET, FILM COATED ORAL
Qty: 90 TABLET | Refills: 0 | Status: SHIPPED | OUTPATIENT
Start: 2019-12-04 | End: 2020-01-15 | Stop reason: SDUPTHER

## 2019-12-04 RX ORDER — LISINOPRIL 10 MG/1
TABLET ORAL
Qty: 90 TABLET | Refills: 0 | Status: SHIPPED | OUTPATIENT
Start: 2019-12-04 | End: 2020-01-15 | Stop reason: SDUPTHER

## 2020-01-06 LAB
ALBUMIN SERPL-MCNC: 4.4 G/DL
ALP BLD-CCNC: 64 U/L
ALT SERPL-CCNC: 44 U/L
ANION GAP SERPL CALCULATED.3IONS-SCNC: 1.8 MMOL/L
ANTIBODY: 0.1
AST SERPL-CCNC: 30 U/L
AVERAGE GLUCOSE: NORMAL
BILIRUB SERPL-MCNC: 0.7 MG/DL (ref 0.1–1.4)
BUN BLDV-MCNC: 14 MG/DL
CALCIUM SERPL-MCNC: 9.6 MG/DL
CHLORIDE BLD-SCNC: 103 MMOL/L
CHOLESTEROL, TOTAL: 165 MG/DL
CHOLESTEROL/HDL RATIO: NORMAL
CO2: 23 MMOL/L
CREAT SERPL-MCNC: 1.03 MG/DL
GFR CALCULATED: 80
GLUCOSE BLD-MCNC: 96 MG/DL
HBA1C MFR BLD: 5.8 %
HDLC SERPL-MCNC: 42 MG/DL (ref 35–70)
HIV AG/AB: NORMAL
LDL CHOLESTEROL CALCULATED: 91 MG/DL (ref 0–160)
POTASSIUM SERPL-SCNC: 4.4 MMOL/L
PROSTATE SPECIFIC ANTIGEN: 2 NG/ML
SODIUM BLD-SCNC: 141 MMOL/L
TOTAL PROTEIN: 6.9
TRIGL SERPL-MCNC: 161 MG/DL
VLDLC SERPL CALC-MCNC: 32 MG/DL

## 2020-01-15 ENCOUNTER — OFFICE VISIT (OUTPATIENT)
Dept: PRIMARY CARE CLINIC | Age: 55
End: 2020-01-15
Payer: COMMERCIAL

## 2020-01-15 VITALS
WEIGHT: 209.8 LBS | HEART RATE: 60 BPM | DIASTOLIC BLOOD PRESSURE: 76 MMHG | BODY MASS INDEX: 34.12 KG/M2 | SYSTOLIC BLOOD PRESSURE: 124 MMHG | OXYGEN SATURATION: 97 % | TEMPERATURE: 97.2 F

## 2020-01-15 PROCEDURE — 99214 OFFICE O/P EST MOD 30 MIN: CPT | Performed by: FAMILY MEDICINE

## 2020-01-15 RX ORDER — LISINOPRIL 10 MG/1
TABLET ORAL
Qty: 30 TABLET | Refills: 6 | Status: SHIPPED | OUTPATIENT
Start: 2020-01-15 | End: 2020-09-23 | Stop reason: SDUPTHER

## 2020-01-15 RX ORDER — ATORVASTATIN CALCIUM 20 MG/1
TABLET, FILM COATED ORAL
Qty: 90 TABLET | Refills: 2 | Status: SHIPPED | OUTPATIENT
Start: 2020-01-15 | End: 2021-01-29

## 2020-01-15 NOTE — PROGRESS NOTES
hyperlipidemia    - atorvastatin (LIPITOR) 20 MG tablet; TAKE 1 TABLET BY MOUTH ONE TIME A DAY  Dispense: 90 tablet; Refill: 2  well controlled HTN. Continue lisinopril 10 mg daily. Lipids are at goal on statin, normal hepatic function and no myalgias. Kidney function and screening for nephropathy are normal/negative. Prediabetes stable. Continue regular cardiovascular exercise, follow a low fat, low cholesterol diet and attempt to lose weight. 4. Enlarged prostate    - DELFINO - Jennifer Madison MD, [facility]    5. Family history of prostate cancer    - DELFINO Madison MD, [facility]    Serial PSA's normal, incidental finding of prostate enlargement by CT pelvis 2 years ago, older brother recently diagnosed with prostate cancer. No LUTS symptoms. Urologist consult recommended. Return in about 6 months (around 7/15/2020). Discussed use, benefit, and side effects of prescribed medications. Barriers to medication compliance addressed. All patient questions answered. Pt voiced understanding.

## 2020-04-10 ENCOUNTER — VIRTUAL VISIT (OUTPATIENT)
Dept: PRIMARY CARE CLINIC | Age: 55
End: 2020-04-10
Payer: COMMERCIAL

## 2020-04-10 PROCEDURE — 99213 OFFICE O/P EST LOW 20 MIN: CPT | Performed by: FAMILY MEDICINE

## 2020-04-10 RX ORDER — MECLIZINE HYDROCHLORIDE 25 MG/1
25 TABLET ORAL 3 TIMES DAILY PRN
Qty: 90 TABLET | Refills: 0 | Status: SHIPPED | OUTPATIENT
Start: 2020-04-10 | End: 2020-05-10

## 2020-04-10 ASSESSMENT — ENCOUNTER SYMPTOMS
GASTROINTESTINAL NEGATIVE: 1
RESPIRATORY NEGATIVE: 1
EYES NEGATIVE: 1

## 2020-04-10 NOTE — PROGRESS NOTES
resolve in 1-4 weeks if not she should contact me or if other associated symptoms. Antivert PRN. Sedation precautions discussed. Fall precautions discussed as well as to avoid driving. Return if symptoms worsen or fail to improve. Iam Amaro is a 60 y.o. male being evaluated by a Virtual Visit (video visit) encounter to address concerns as mentioned above. A caregiver was present when appropriate. Due to this being a TeleHealth encounter (During ADHPB-84 public health emergency), evaluation of the following organ systems was limited: Vitals/Constitutional/EENT/Resp/CV/GI//MS/Neuro/Skin/Heme-Lymph-Imm. Pursuant to the emergency declaration under the [address] [place] [address] authority and the [employer] and [employer] Act, this Virtual Visit was conducted with patient's (and/or legal guardian's) consent, to reduce the patient's risk of exposure to COVID-19 and provide necessary medical care. The patient (and/or legal guardian) has also been advised to contact this office for worsening conditions or problems, and seek emergency medical treatment and/or call 911 if deemed necessary. Services were provided through a video synchronous discussion virtually to substitute for in-person clinic visit. Patient and provider were located at their individual homes. --Edith Gamble MD on 4/10/2020 at 3:35 PM    An electronic signature was used to authenticate this note.

## 2020-06-04 ENCOUNTER — HOSPITAL ENCOUNTER (OUTPATIENT)
Dept: GENERAL RADIOLOGY | Age: 55
Discharge: HOME | End: 2020-06-04
Payer: COMMERCIAL

## 2020-06-04 PROCEDURE — 72100 X-RAY EXAM L-S SPINE 2/3 VWS: CPT

## 2020-06-12 ENCOUNTER — HOSPITAL ENCOUNTER (OUTPATIENT)
Dept: CT IMAGING | Age: 55
Discharge: HOME | End: 2020-06-12
Payer: COMMERCIAL

## 2020-06-12 PROCEDURE — 74176 CT ABD & PELVIS W/O CONTRAST: CPT

## 2020-09-23 RX ORDER — LISINOPRIL 10 MG/1
TABLET ORAL
Qty: 30 TABLET | Refills: 6 | Status: SHIPPED | OUTPATIENT
Start: 2020-09-23 | End: 2021-05-17

## 2021-01-07 ENCOUNTER — TELEPHONE (OUTPATIENT)
Dept: PRIMARY CARE CLINIC | Age: 56
End: 2021-01-07

## 2021-01-18 ENCOUNTER — OFFICE VISIT (OUTPATIENT)
Dept: PRIMARY CARE CLINIC | Age: 56
End: 2021-01-18
Payer: COMMERCIAL

## 2021-01-18 VITALS
DIASTOLIC BLOOD PRESSURE: 80 MMHG | RESPIRATION RATE: 16 BRPM | OXYGEN SATURATION: 97 % | WEIGHT: 215 LBS | HEART RATE: 87 BPM | BODY MASS INDEX: 34.97 KG/M2 | TEMPERATURE: 97.9 F | SYSTOLIC BLOOD PRESSURE: 122 MMHG

## 2021-01-18 DIAGNOSIS — E78.49 OTHER HYPERLIPIDEMIA: ICD-10-CM

## 2021-01-18 DIAGNOSIS — R73.03 PREDIABETES: ICD-10-CM

## 2021-01-18 DIAGNOSIS — N40.0 ENLARGED PROSTATE: ICD-10-CM

## 2021-01-18 DIAGNOSIS — I10 ESSENTIAL HYPERTENSION: ICD-10-CM

## 2021-01-18 DIAGNOSIS — Z00.00 PHYSICAL EXAM: Primary | ICD-10-CM

## 2021-01-18 PROCEDURE — 99396 PREV VISIT EST AGE 40-64: CPT | Performed by: FAMILY MEDICINE

## 2021-01-18 PROCEDURE — 99213 OFFICE O/P EST LOW 20 MIN: CPT | Performed by: FAMILY MEDICINE

## 2021-01-18 ASSESSMENT — PATIENT HEALTH QUESTIONNAIRE - PHQ9
SUM OF ALL RESPONSES TO PHQ QUESTIONS 1-9: 0
SUM OF ALL RESPONSES TO PHQ9 QUESTIONS 1 & 2: 0
2. FEELING DOWN, DEPRESSED OR HOPELESS: 0
1. LITTLE INTEREST OR PLEASURE IN DOING THINGS: 0
SUM OF ALL RESPONSES TO PHQ QUESTIONS 1-9: 0

## 2021-01-18 ASSESSMENT — ENCOUNTER SYMPTOMS
ALLERGIC/IMMUNOLOGIC NEGATIVE: 1
BACK PAIN: 0
EYES NEGATIVE: 1
RESPIRATORY NEGATIVE: 1
GASTROINTESTINAL NEGATIVE: 1

## 2021-01-18 NOTE — PROGRESS NOTES
Social History     Tobacco Use   Smoking Status Never Smoker   Smokeless Tobacco Never Used     PMH, surgeries, SH, FH, allergies reviewed. Medication list reviewed. Chief Complaint   Patient presents with    Annual Exam     yearly physical, no any other concerns. As above,  Review of Systems   Constitutional: Negative. HENT: Negative. Eyes: Negative. Respiratory: Negative. Cardiovascular: Negative. Gastrointestinal: Negative. Endocrine: Negative. Genitourinary: Negative. Musculoskeletal: Negative for arthralgias, back pain, gait problem, joint swelling, myalgias, neck pain and neck stiffness. Skin: Negative. Allergic/Immunologic: Negative. Neurological: Negative. Hematological: Negative. Psychiatric/Behavioral: Negative. Immunization History   Administered Date(s) Administered    Tdap (Boostrix, Adacel) 10/11/2010       Health Maintenance Due   Topic Date Due    Shingles Vaccine (1 of 2) 01/01/2013    Colon cancer screen colonoscopy  06/18/2020    Flu vaccine (1) 09/01/2020    DTaP/Tdap/Td vaccine (2 - Td) 10/11/2020       Ever smoker: never  Alcohol use: no   Illicit drug use: no     He exercises regularly  4 miles almost daily. Regular dental exam: yes  Regular eye exam: advised to schedule    History reviewed. No pertinent family history. Colon cancer: no  Postate cancer: older brother  Lung cancer: no  Any other cancer: maternal grandfather stomach  CVA: no  MI: father early CAD    Objective:   VS reviewed    Vitals:    01/18/21 0743   BP: 122/80   Site: Left Upper Arm   Position: Sitting   Cuff Size: Medium Adult   Pulse: 87   Resp: 16   Temp: 97.9 °F (36.6 °C)   SpO2: 97%   Weight: 215 lb (97.5 kg)     Body mass index is 34.97 kg/m².    Wt Readings from Last 3 Encounters:   01/18/21 215 lb (97.5 kg)   01/15/20 209 lb 12.8 oz (95.2 kg)   07/17/19 212 lb 3.2 oz (96.3 kg)     BP Readings from Last 3 Encounters:   01/18/21 122/80 01/15/20 124/76   07/17/19 110/82       Physical Exam  Constitutional:       General: He is not in acute distress. HENT:      Right Ear: Tympanic membrane and ear canal normal.      Left Ear: Tympanic membrane and ear canal normal.   Eyes:      General:         Right eye: No discharge. Left eye: No discharge. Conjunctiva/sclera: Conjunctivae normal.      Pupils: Pupils are equal, round, and reactive to light. Neck:      Thyroid: No thyromegaly. Vascular: No JVD. Cardiovascular:      Rate and Rhythm: Normal rate and regular rhythm. Pulses: Normal pulses. Heart sounds: Normal heart sounds. No murmur. Pulmonary:      Effort: Pulmonary effort is normal. No respiratory distress. Breath sounds: Normal breath sounds. Abdominal:      General: Bowel sounds are normal. There is no distension. Palpations: Abdomen is soft. Tenderness: There is no abdominal tenderness. Musculoskeletal:         General: No tenderness. Right lower leg: No edema. Left lower leg: No edema. Lymphadenopathy:      Cervical: No cervical adenopathy. Skin:     Capillary Refill: Capillary refill takes less than 2 seconds. Coloration: Skin is not jaundiced or pale. Findings: No bruising, erythema, lesion or rash. Neurological:      Mental Status: He is alert and oriented to person, place, and time. Psychiatric:         Mood and Affect: Mood normal.         Behavior: Behavior normal.         Thought Content:  Thought content normal.         Judgment: Judgment normal.         Lab Results   Component Value Date    WBC 6.2 01/10/2019    HGB 15.7 01/10/2019    HCT 46.6 01/10/2019     01/10/2019     Lab Results   Component Value Date     01/06/2021    K 4.6 01/06/2021     01/06/2021    CO2 23 01/06/2021    BUN 16 01/06/2021    CREATININE 1.13 01/06/2021    GLUCOSE 103 (H) 01/06/2021    CALCIUM 9.5 01/06/2021    PROT 7.2 01/06/2021    LABALBU 4.4 01/06/2021 BILITOT 0.6 01/06/2021    ALKPHOS 76 01/06/2021    AST 28 01/06/2021    ALT 41 01/06/2021    LABGLOM 71 01/06/2021    GFRAA 82 01/06/2021    AGRATIO 1.6 01/06/2021    GLOB 2.8 01/06/2021       Lab Results   Component Value Date    CHOL 165 01/06/2021    CHOL 165 01/03/2020    CHOL 147 06/12/2019     Lab Results   Component Value Date    TRIG 166 (H) 01/06/2021    TRIG 161 01/03/2020    TRIG 101 06/12/2019     Lab Results   Component Value Date    HDL 43 01/06/2021    HDL 42 01/03/2020    HDL 40 (A) 06/12/2019     Lab Results   Component Value Date    LDLCALC 93 01/06/2021    LDLCALC 91 01/03/2020    LDLCALC 87 06/12/2019     Lab Results   Component Value Date    LABVLDL 29 01/06/2021    VLDL 32 01/03/2020    VLDL 20 06/12/2019    VLDL 38 01/17/2019     No results found for: CHOLHDLRATIO    Lab Results   Component Value Date    LABA1C 5.9 (H) 01/06/2021    LABA1C 5.8 01/03/2020    LABA1C 5.8 06/12/2019     No results found for: EAG    Lab Results   Component Value Date    PSA 2.0 01/06/2021    PSA 2.0 01/03/2020    PSA 1.8 01/10/2019       Assessment/Plan:    1. Physical exam    - CBC Auto Differential; Future    2. Essential hypertension  Well controlled same meds  - Comprehensive Metabolic Panel; Future  - Microalbumin / Creatinine Urine Ratio; Future    3. Prediabetes  Stable. lifestyle modifications discussed. - Hemoglobin A1C; Future    4. Other hyperlipidemia  Lipids good on statins, normal hepatic function, no myalgias. - Comprehensive Metabolic Panel; Future  - Lipid Panel; Future    5. Enlarged prostate  History of 2018 incidental finding by CT no abnormalities reported CT abdomen pelvis 06/2020 has normal serial PSA's. Older brother with prostate cancer. Has established with urologist plan to see yearly. - PSA, Prostatic Specific Antigen; Future      Health Maintenance reviewed - Had  Flu shot and Shingrix through his local pharmacy, prefers to wait for Tdap after having COVID19 vaccine. The patient is advised to continue progressive daily aerobic exercise program, attempt to lose weight and return for routine annual checkups. Return in about 6 months (around 7/18/2021) for folow up HTN. Current Outpatient Medications   Medication Sig Dispense Refill    lisinopril (PRINIVIL;ZESTRIL) 10 MG tablet TAKE 1 TABLET BY MOUTH ONE TIME A DAY 30 tablet 6    atorvastatin (LIPITOR) 20 MG tablet TAKE 1 TABLET BY MOUTH ONE TIME A DAY 90 tablet 2    Fiber POWD Take 2 Scoops by mouth daily      vitamin C (ASCORBIC ACID) 500 MG tablet Take 500 mg by mouth daily      vitamin D (CHOLECALCIFEROL) 1000 UNIT TABS tablet Take 1,000 Units by mouth daily       No current facility-administered medications for this visit.